# Patient Record
Sex: FEMALE | Race: WHITE | URBAN - METROPOLITAN AREA
[De-identification: names, ages, dates, MRNs, and addresses within clinical notes are randomized per-mention and may not be internally consistent; named-entity substitution may affect disease eponyms.]

---

## 2018-12-06 ENCOUNTER — OFFICE VISIT - HEALTHEAST (OUTPATIENT)
Dept: GERIATRICS | Facility: CLINIC | Age: 58
End: 2018-12-06

## 2018-12-06 ENCOUNTER — COMMUNICATION - HEALTHEAST (OUTPATIENT)
Dept: GERIATRICS | Facility: CLINIC | Age: 58
End: 2018-12-06

## 2018-12-06 ENCOUNTER — AMBULATORY - HEALTHEAST (OUTPATIENT)
Dept: ADMINISTRATIVE | Facility: CLINIC | Age: 58
End: 2018-12-06

## 2018-12-06 DIAGNOSIS — M62.830 BACK MUSCLE SPASM: ICD-10-CM

## 2018-12-06 DIAGNOSIS — M54.89 OTHER CHRONIC BACK PAIN: ICD-10-CM

## 2018-12-06 DIAGNOSIS — Z74.09 MOBILITY IMPAIRED: ICD-10-CM

## 2018-12-06 DIAGNOSIS — F11.24 OPIOID DEPENDENCE WITH OPIOID-INDUCED MOOD DISORDER (H): ICD-10-CM

## 2018-12-06 DIAGNOSIS — R52 PAIN: ICD-10-CM

## 2018-12-06 DIAGNOSIS — G89.29 OTHER CHRONIC BACK PAIN: ICD-10-CM

## 2018-12-07 ENCOUNTER — OFFICE VISIT - HEALTHEAST (OUTPATIENT)
Dept: GERIATRICS | Facility: CLINIC | Age: 58
End: 2018-12-07

## 2018-12-07 DIAGNOSIS — Z74.09 MOBILITY IMPAIRED: ICD-10-CM

## 2018-12-07 DIAGNOSIS — M54.89 OTHER CHRONIC BACK PAIN: ICD-10-CM

## 2018-12-07 DIAGNOSIS — G89.29 OTHER CHRONIC BACK PAIN: ICD-10-CM

## 2018-12-07 DIAGNOSIS — M62.830 BACK MUSCLE SPASM: ICD-10-CM

## 2018-12-10 ENCOUNTER — RECORDS - HEALTHEAST (OUTPATIENT)
Dept: LAB | Facility: CLINIC | Age: 58
End: 2018-12-10

## 2018-12-10 ENCOUNTER — OFFICE VISIT - HEALTHEAST (OUTPATIENT)
Dept: GERIATRICS | Facility: CLINIC | Age: 58
End: 2018-12-10

## 2018-12-10 DIAGNOSIS — G89.29 OTHER CHRONIC BACK PAIN: ICD-10-CM

## 2018-12-10 DIAGNOSIS — J06.9 UPPER RESPIRATORY INFECTION WITH COUGH AND CONGESTION: ICD-10-CM

## 2018-12-10 DIAGNOSIS — Z74.09 MOBILITY IMPAIRED: ICD-10-CM

## 2018-12-10 DIAGNOSIS — M54.89 OTHER CHRONIC BACK PAIN: ICD-10-CM

## 2018-12-10 LAB
FLUAV AG SPEC QL IA: ABNORMAL
FLUBV AG SPEC QL IA: ABNORMAL

## 2018-12-12 ENCOUNTER — OFFICE VISIT - HEALTHEAST (OUTPATIENT)
Dept: GERIATRICS | Facility: CLINIC | Age: 58
End: 2018-12-12

## 2018-12-12 DIAGNOSIS — J11.1 INFLUENZA: ICD-10-CM

## 2018-12-12 DIAGNOSIS — M54.89 OTHER CHRONIC BACK PAIN: ICD-10-CM

## 2018-12-12 DIAGNOSIS — G89.29 OTHER CHRONIC BACK PAIN: ICD-10-CM

## 2018-12-14 ENCOUNTER — AMBULATORY - HEALTHEAST (OUTPATIENT)
Dept: GERIATRICS | Facility: CLINIC | Age: 58
End: 2018-12-14

## 2018-12-15 ENCOUNTER — COMMUNICATION - HEALTHEAST (OUTPATIENT)
Dept: SCHEDULING | Facility: CLINIC | Age: 58
End: 2018-12-15

## 2018-12-16 ENCOUNTER — ANCILLARY PROCEDURE (OUTPATIENT)
Dept: GENERAL RADIOLOGY | Facility: CLINIC | Age: 58
End: 2018-12-16
Attending: FAMILY MEDICINE
Payer: COMMERCIAL

## 2018-12-16 ENCOUNTER — OFFICE VISIT (OUTPATIENT)
Dept: URGENT CARE | Facility: URGENT CARE | Age: 58
End: 2018-12-16
Payer: COMMERCIAL

## 2018-12-16 VITALS
RESPIRATION RATE: 18 BRPM | HEART RATE: 80 BPM | WEIGHT: 115 LBS | SYSTOLIC BLOOD PRESSURE: 106 MMHG | TEMPERATURE: 99.1 F | OXYGEN SATURATION: 98 % | DIASTOLIC BLOOD PRESSURE: 58 MMHG

## 2018-12-16 DIAGNOSIS — R06.02 SOB (SHORTNESS OF BREATH): ICD-10-CM

## 2018-12-16 DIAGNOSIS — J22 LOWER RESPIRATORY TRACT INFECTION: Primary | ICD-10-CM

## 2018-12-16 DIAGNOSIS — J98.01 BRONCHOSPASM: ICD-10-CM

## 2018-12-16 PROCEDURE — 94640 AIRWAY INHALATION TREATMENT: CPT | Mod: 76 | Performed by: FAMILY MEDICINE

## 2018-12-16 PROCEDURE — 71046 X-RAY EXAM CHEST 2 VIEWS: CPT

## 2018-12-16 PROCEDURE — 99205 OFFICE O/P NEW HI 60 MIN: CPT | Mod: 25 | Performed by: FAMILY MEDICINE

## 2018-12-16 RX ORDER — ACETAMINOPHEN 325 MG/1
325-650 TABLET ORAL EVERY 6 HOURS PRN
COMMUNITY

## 2018-12-16 RX ORDER — ALBUTEROL SULFATE 90 UG/1
2 AEROSOL, METERED RESPIRATORY (INHALATION) EVERY 6 HOURS PRN
Qty: 1 INHALER | Refills: 0 | Status: SHIPPED | OUTPATIENT
Start: 2018-12-16 | End: 2019-01-01

## 2018-12-16 RX ORDER — PROPRANOLOL HCL 60 MG
CAPSULE, EXTENDED RELEASE 24HR ORAL DAILY
COMMUNITY

## 2018-12-16 RX ORDER — IPRATROPIUM BROMIDE AND ALBUTEROL SULFATE 2.5; .5 MG/3ML; MG/3ML
3 SOLUTION RESPIRATORY (INHALATION) ONCE
Status: COMPLETED | OUTPATIENT
Start: 2018-12-16 | End: 2018-12-17

## 2018-12-16 RX ORDER — AZITHROMYCIN 250 MG/1
TABLET, FILM COATED ORAL
Qty: 6 TABLET | Refills: 0 | Status: SHIPPED | OUTPATIENT
Start: 2018-12-16

## 2018-12-16 RX ORDER — PREDNISONE 20 MG/1
40 TABLET ORAL DAILY
Qty: 10 TABLET | Refills: 0 | Status: SHIPPED | OUTPATIENT
Start: 2018-12-16 | End: 2018-12-21

## 2018-12-16 RX ORDER — METHOCARBAMOL 500 MG/1
500 TABLET, FILM COATED ORAL 4 TIMES DAILY PRN
COMMUNITY

## 2018-12-16 RX ORDER — PREGABALIN 75 MG/1
75 CAPSULE ORAL 3 TIMES DAILY
COMMUNITY

## 2018-12-16 RX ORDER — DIAZEPAM 5 MG
5 TABLET ORAL EVERY 6 HOURS PRN
COMMUNITY

## 2018-12-17 RX ADMIN — IPRATROPIUM BROMIDE AND ALBUTEROL SULFATE 3 ML: 2.5; .5 SOLUTION RESPIRATORY (INHALATION) at 13:47

## 2018-12-17 RX ADMIN — IPRATROPIUM BROMIDE AND ALBUTEROL SULFATE 3 ML: 2.5; .5 SOLUTION RESPIRATORY (INHALATION) at 13:49

## 2018-12-17 NOTE — PROGRESS NOTES
SUBJECTIVE:   New patient to [place]    Laura Ponce is a 58 year old female presenting with a chief complaint of cough and wheezing.    Patient was hospitalized at [facility] for spine surgery, discharged home but did not do well, ended up going back to hospital and was hospitalized for 1 week and afterwards sent to [facility]CU in [place].    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  Patient contracted influenza when in TCU but was too late for treatment by the time this was diagnosed.  Patient thought that was feeling a little better about 5 days out but 2 days ago, developed worsening symptoms.  Endorsed SOB, wheezing.  Patient denies any fever but has been taking medications.  Patient was prior TOB user.  Patient also complained of ?drainage from back surgery wound but was told that okay.  Patient has been discharged from TCU about 2 days ago.      Follow up with surgeon 12/28    Family history - negative for asthma or arthritis, DJD    No PCP currently - moved from [place]    History reviewed. No pertinent past medical history.  Current Outpatient Medications   Medication Sig Dispense Refill     acetaminophen (TYLENOL) 325 MG tablet Take 325-650 mg by mouth every 6 hours as needed for mild pain       diazepam (VALIUM) 5 MG tablet Take 5 mg by mouth every 6 hours as needed for anxiety       methocarbamol (ROBAXIN) 500 MG tablet Take 500 mg by mouth 4 times daily as needed for muscle spasms       oxyCODONE HCl (OXAYDO) 5 MG TABA Take 5 mg by mouth       pregabalin (LYRICA) 75 MG capsule Take 75 mg by mouth 3 times daily       propranolol ER (INDERAL LA) 60 MG 24 hr capsule Take by mouth daily       Social History     Tobacco Use     Smoking status: Never Smoker     Smokeless tobacco: Never Used   Substance Use Topics     Alcohol use: Not on file       ROS:  Review of systems otherwise  negative except as stated above.    OBJECTIVE:  /58   Pulse 80   Temp 99.1  F (37.3  C) (Oral)   Resp 18   Wt 52.2 kg (115 lb)   SpO2 92%   GENERAL APPEARANCE: healthy, alert and mild distress  EYES: EOMI,  PERRL, conjunctiva clear  HENT: ear canals and TM's normal.  Nose and mouth without ulcers, erythema or lesions  NECK: supple, nontender, no lymphadenopathy  RESP: lungs coarse rhonchi diffusely, scattered wheezes, hard back brace   CV: regular rates and rhythm, normal S1 S2, no murmur noted  ABDOMEN:  soft, nontender, no HSM or masses and bowel sounds normal  Extremities: no peripheral edema or tenderness, peripheral pulses normal  SKIN: no suspicious lesions or rashes, healing wound with mild surrounding erythema, fibrous yellowed central patches  PSYCH: mentation appears normal, affect flat and fatigued    CXR - thoracic-lumbar hardware, mild perihilar congestion, no acute infiltrate, no pleural effusion    ASSESSMENT/PLAN:  (J22) Lower respiratory tract infection  (primary encounter diagnosis)  Plan: azithromycin (ZITHROMAX) 250 MG tablet            (J98.01) Bronchospasm  Plan: XR Chest 2 Views, ipratropium - albuterol 0.5         mg/2.5 mg/3 mL (DUONEB) neb solution 3 mL,         INHALATION/NEBULIZER TREATMENT, INITIAL,         ipratropium - albuterol 0.5 mg/2.5 mg/3 mL         (DUONEB) neb solution 3 mL, HC         INHALATION/NEBULIZER TREATMENT, ADDITIONAL,         albuterol (PROAIR HFA/PROVENTIL HFA/VENTOLIN         HFA) 108 (90 Base) MCG/ACT inhaler, predniSONE         (DELTASONE) 20 MG tablet            (R06.02) SOB (shortness of breath)  Comment: bronchospasm  Plan: XR Chest 2 Views, ipratropium - albuterol 0.5         mg/2.5 mg/3 mL (DUONEB) neb solution 3 mL,         INHALATION/NEBULIZER TREATMENT, INITIAL,         ipratropium - albuterol 0.5 mg/2.5 mg/3 mL         (DUONEB) neb solution 3 mL, HC         INHALATION/NEBULIZER TREATMENT, ADDITIONAL,         albuterol (PROAIR HFA/PROVENTIL  HFA/VENTOLIN         HFA) 108 (90 Base) MCG/ACT inhaler            Duoneb given with mild improvement of symptoms with no changes in oxygen, still at 92% with wheezes.  Duoneb #2 given with improvement of symptoms, still with wheezes but improvement of O2 to 98%.  RX Azithromycin given for lower respiratory tract infection, RX prednisone burst given for bronchospasm symptoms.  RX albuterol inhaler given for cough and wheezing.  Encourage to continue with incentive spirometry especially in setting of recent back surgery.  To ER if any acute worsening of symptoms.    Follow up with primary provider within 1 week.    Chandana Zuniga MD

## 2018-12-17 NOTE — PATIENT INSTRUCTIONS
Take full course of antibiotic for lower respiratory tract infection.  Take full course of prednisone burst.  Use albuterol inhaler to help with wheezing, cough, and shortness of breath.    Continue with current medications from Surgeon.      Patient Education     Bronchospasm (Adult)    Bronchospasm occurs when the airways (bronchial tubes) go into spasm and contract. This makes it hard to breathe and causes wheezing (a high-pitched whistling sound). Bronchospasm can also cause frequent coughing without wheezing.  Bronchospasm is due to irritation, inflammation, or allergic reaction of the airways. People with asthma get bronchospasm. However, not everyone with bronchospasm has asthma.  Being exposed to harmful fumes, a recent case of bronchitis, exercise, or a flare-up of chronic obstructive pulmonary disease (COPD) may cause the airways to spasm. An episode of bronchospasm may last 7 to 14 days. Medicine may be prescribed to relax the airways and prevent wheezing. Antibiotics will be prescribed only if your healthcare provider thinks there is a bacterial infection. Antibiotics do not help a viral infection.  Home care    Drink lots of water or other fluids (at least 10 glasses a day) during an attack. This will loosen lung secretions and make it easier to breathe. If you have heart or kidney disease, check with your doctor before you drink extra fluids.    Take prescribed medicine exactly at the times advised. If you take an inhaled medicine to help with breathing, don't use it more than once every 4 hours, unless told to do so. If prescribed an antibiotic or prednisone, take all of the medicine, even if you are feeling better after a few days.    Don't smoke. Also avoid being exposed to secondhand smoke.    If you were given an inhaler, use it exactly as directed. If you need to use it more often than prescribed, your condition may be getting worse. Contact your healthcare provider.  Follow-up care  Follow up  with your healthcare provider, or as advised.  If you are age 65 or older, have a chronic lung disease or condition that affects your immune system, or you smoke, ask your healthcare provider about getting a pneumococcal vaccine, as well as a yearly flu shot (influenza vaccine).  When to seek medical advice  Call your healthcare provider right away if any of these occur:    You need to use your inhalers more often than usual    Fever of 100.4 F (38 C) or higher, or as directed by your healthcare provider    Cough that brings up lots of dark-colored sputum (mucus)    You don't get better within 24 hours  Call 911  Call 911 if any of these occur:    Coughing up bloody sputum (mucus)    Chest pain with each breath    Increased wheezing or shortness of breath   Date Last Reviewed: 6/1/2018 2000-2018 [employer]. [address] [place] [address]. All rights reserved. This information is not intended as a substitute for professional medical care. Always follow your healthcare professional's instructions.           Patient Education     Bronchitis, Antibiotic Treatment (Adult)    Bronchitis is an infection of the air passages (bronchial tubes) in your lungs. It often occurs when you have a cold. This illness is contagious during the first few days and is spread through the air by coughing and sneezing, or by direct contact (touching the sick person and then touching your own eyes, nose, or mouth).  Symptoms of bronchitis include cough with mucus (phlegm) and low-grade fever. Bronchitis usually lasts 7 to 14 days. Mild cases can be treated with simple home remedies. More severe infection is treated with an antibiotic.  Home care  Follow these guidelines when caring for yourself at home:    If your symptoms are severe, rest at home for the first 2 to 3 days. When you go back to your usual activities, don't let yourself get too tired.    Don't smoke. Also stay away from secondhand smoke.    You may  use over-the-counter medicines to control fever or pain, unless another medicine was prescribed. If you have chronic liver or kidney disease or have ever had a stomach ulcer or gastrointestinal bleeding, talk with your healthcare provider before using these medicines. Also talk to your provider if you are taking medicine to prevent blood clots. Aspirin should never be given to anyone younger than 18 who is ill with a viral infection or fever. It may cause severe liver or brain damage.    Your appetite may be low, so a light diet is fine. Stay well hydrated by drinking 6 to 8 glasses of fluids per day. This includes water, soft drinks, sports drinks, juices, tea, or soup. Extra fluids will help loosen mucus in your nose and lungs.    Over-the-counter cough, cold, and sore-throat medicines will not shorten the length of the illness, but they may be helpful to reduce your symptoms. Don't use decongestants if you have high blood pressure.    Finish all antibiotic medicine. Do this even if you are feeling better after only a few days.  Follow-up care  Follow up with your healthcare provider, or as advised. If you had an X-ray or ECG (electrocardiogram), a specialist will review it. You will be told of any new test results that may affect your care.  If you are age 65 or older, if you smoke, or if you have a chronic lung disease or condition that affects your immune system, ask your healthcare provider about getting a pneumococcal vaccine and a yearly flu shot (influenza vaccine).  When to seek medical advice  Call your healthcare provider right away if any of these occur:    Fever of 100.4 F (38 C) or higher, or as directed by your healthcare provider    Coughing up more sputum    Weakness, drowsiness, headache, facial pain, ear pain, or a stiff neck     Call 911  Call 911 if any of these occur.    Coughing up blood    Weakness, drowsiness, headache, or stiff neck that get worse    Trouble breathing, wheezing, or pain  with breathing   Date Last Reviewed: 6/1/2018 2000-2018 [employer]. [address] [facility], [place] [address]. All rights reserved. This information is not intended as a substitute for professional medical care. Always follow your healthcare professional's instructions.           Patient Education     What is COPD?  COPD stands for chronic obstructive pulmonary disease. It means the airways in your lungs are blocked (obstructed). Because of this, it is hard to breathe. You may have trouble with daily activities because of shortness of breath. Over time the shortness of breath usually worsens making it more and more difficult to take care of yourself and take part in activities. Chronic bronchitis and emphysema are two common types of COPD.  What happens in chronic bronchitis?    The cells in the airways make more mucus than normal. The mucus builds up, narrowing the airways. This means less air travels into and out of the lungs. The lining of the airways may also become inflamed (swollen) and causes the airways to narrow even more.        What happens in emphysema?    The small airways are damaged and lose their stretchiness. The airways collapse when you exhale, causing air to get trapped in the air sacs. This means that less oxygen enters the blood vessels and less oxygen is delivered to all of the cells of your body. This makes it hard to breathe.     Damage to cilia    Cilia are small hairs that line and protect the airways. Smoking damages the cilia. Damaged cilia can t sweep mucus and particles away. Some of the cilia are destroyed. This damage worsens COPD.  How did I get COPD?  Most people get COPD from smoking. Cigarette smoke damages lungs, which can develop into COPD over many years.  How COPD affects you  COPD makes you work harder to breathe. Air may get trapped in the lungs, which prevents your lungs from filling completely with fresh oxygen-filled air when you inhale (breathe in).  It's harder to take deep breaths especially when you are active and start breathing faster. Over time, your lungs may become enlarged filling the lung with air that does not transfer oxygen into the blood. These problems cause you to have shortness of breath (also called dyspnea). Wheezing (hoarse, whistling breathing), chronic cough, and fatigue (feeling tired and worn out) are also common.   Date Last Reviewed: 5/1/2016 2000-2018 [employer]. [address] [place] [id number]. All rights reserved. This information is not intended as a substitute for professional medical care. Always follow your healthcare professional's instructions.

## 2021-06-02 VITALS — WEIGHT: 126.6 LBS

## 2021-06-02 VITALS — WEIGHT: 126.1 LBS

## 2021-06-16 PROBLEM — J11.1 INFLUENZA: Status: ACTIVE | Noted: 2018-12-12

## 2021-06-16 PROBLEM — J06.9 UPPER RESPIRATORY INFECTION WITH COUGH AND CONGESTION: Status: ACTIVE | Noted: 2018-12-10

## 2021-06-16 PROBLEM — G89.29 CHRONIC BACK PAIN: Status: ACTIVE | Noted: 2018-12-07

## 2021-06-16 PROBLEM — Z74.09 MOBILITY IMPAIRED: Status: ACTIVE | Noted: 2018-12-07

## 2021-06-16 PROBLEM — M62.830 BACK MUSCLE SPASM: Status: ACTIVE | Noted: 2018-12-07

## 2021-06-16 PROBLEM — M54.9 CHRONIC BACK PAIN: Status: ACTIVE | Noted: 2018-12-07

## 2021-06-22 NOTE — PROGRESS NOTES
[facility]      NAME:  Laura Harvey             :  1960    MRN: [id number]    CODE STATUS:  FULL CODE    FACILITY: [facility] [[id number]]         CHIEF COMPLAIN/REASON FOR VISIT:  Chief Complaint   Patient presents with     Review Of Multiple Medical Conditions       HISTORY OF PRESENT ILLNESS: Laura Harvey is a 58 y.o. female being seen at the request of the nurses as pt is running a low grade temp, has increase congestion and decreased 02 sat 88%.  She was in the hospital for a lumbar spinal fusion and extensive back surgery. Then,  postoperatively she did well was discharged home but subsequently presented back to the hospital with acute postoperative pain with history of fall and dysuria she was diagnosed with a UTI and was readmitted  Her history reveal she is dependant on high doses of narcotics. She is on Lyrica, oxycodone and valium, see med list.   She is frail in appearence., pale and reports increase shortness of breath.    Allergies   Allergen Reactions     Adhesive Hives     Sulfa (Sulfonamide Antibiotics) Hives   :     Current Outpatient Medications   Medication Sig     acetaminophen (TYLENOL) 500 MG tablet Take 1,000 mg by mouth 3 (three) times a day For 1 week then 1000mg q8h prn .     calcium carbonate-mag hydroxid (ROLAIDS) 550-110 mg Chew Chew 2 tablets as needed.     calcium-vitamin D 500 mg(1,250mg) -200 unit per tablet Take 2 tablets by mouth 2 (two) times a day with meals.     celecoxib (CELEBREX) 100 MG capsule Take 100 mg by mouth 2 (two) times a day.     diazePAM (VALIUM) 5 MG tablet Take 5 mg by mouth every 8 (eight) hours as needed for anxiety.     methocarbamol (ROBAXIN) 500 MG tablet Take 500 mg by mouth every 6 (six) hours as needed.     multivitamin with minerals tablet Take 1 tablet by mouth daily.     oxyCODONE (OXYCONTIN) 20 mg 12 hr tablet Take 20 mg by mouth every 8 (eight) hours.     oxyCODONE (ROXICODONE) 5 MG immediate  release tablet Take 2-3 tablets (10-15 mg total) by mouth every 4 (four) hours as needed for pain (2tabs pain 3-6, 3tabs pain 7-10).     oxymetazoline (AFRIN) 0.05 % nasal spray Apply 2 sprays into each nostril daily as needed for congestion.     polyethylene glycol (MIRALAX) 17 gram packet Take 17 g by mouth daily as needed.     pregabalin (LYRICA) 75 MG capsule Take 75 mg by mouth 3 (three) times a day.     propranolol (INDERAL LA) 60 mg 24 hr capsule Take 60 mg by mouth 2 (two) times a day.     senna-docusate (SENNOSIDES-DOCUSATE SODIUM) 8.6-50 mg tablet Take 1 tablet by mouth at bedtime.         REVIEW OF SYSTEMS:    Currently, LOW GRADE fever, no chills, or rigors. Does not have any visual or hearing problems. Denies any chest pain, headaches, palpitations, lightheadedness, dizziness, shortness of breath, non productive cough. Appetite is good. Denies any GERD symptoms. Denies any difficulty with swallowing, nausea, or vomiting.  Denies any abdominal pain, diarrhea or constipation. Denies any urinary symptoms. No insomnia. No active bleeding. No rash. Reports back pain and stiffness.      PHYSICAL EXAMINATION:  Vitals:    12/10/18 1542   BP: 107/63   Pulse: 69   Temp: 99.3  F (37.4  C)   Weight: 126 lb 9.6 oz (57.4 kg)         GENERAL: Thin middle aged female,  Awake, Alert, oriented x3, not in any form of acute distress, answers questions appropriately, follows simple commands, conversant  HEENT: Head is normocephalic with normal hair distribution. No evidence of trauma. Ears: No acute purulent discharge. Eyes: Conjunctivae pink with no scleral jaundice. Nose: Normal mucosa and septum.   CHEST: No tenderness or deformity, no crepitus  LUNG:Bilateral wheezing, DM BS.  BACK: No kyphosis of the thoracic spine. Symmetric, no curvature, she is sitting erect in chair, fully dressed.  CVS: There is good S1  S2, rhythm is regular.  ABDOMEN: Globular and soft, nontender to palpation, non distended, no masses, no  organomegaly, good bowel sounds, no rebound or guarding, no peritoneal signs.   EXTREMITIES: Atraumatic. Full range of motion on both upper and lower extremities, there is no tenderness to palpation, no pedal edema, no cyanosis or clubbing, no calf tenderness, normal cap refill, no joint swelling.  SKIN: Warm and dry, no erythema noted, no rashes or lesions.  NEUROLOGICAL: The patient is oriented to person, place and time. Strength and sensation are grossly intact. Face is symmetric.                    LABS:    No results found for: WBC, HGB, HCT, MCV, PLT    No results found for this or any previous visit.        No results found for: HGBA1C  No results found for: [id number]  No results found for: [id number]    ASSESSMENT/PLAN:  1. Other chronic back pain    2. Mobility impaired    3. Upper respiratory infection with cough and congestion      1/2. Chronic back pain: On valium, lyrica and oxycodone.on celbrex,started last week, but per nursing continues with prn oxycodone as well.  Up with walker when ambulating.. Will continue with therapy for strengthening and rehab services.    3. New onset respiratory congestion: 2 view CXR, duo nebs three times a day , Claratin 10 mg po and robitussin 5 cc po prn. We will also order a nasal swab, for influenza, as tis the season.           Electronically signed by:  Юлия Villatoro CNP  This progress note was completed using Dragon software and there may be grammatical errors.      38 minutes spent of which greater than 75% was face to face communication with the patient about above plan of care

## 2021-06-22 NOTE — PROGRESS NOTES
[facility]      Code Status:  FULL CODE  Visit Type: H & P     Facility:  [facility] [[id number]]           History of Present Illness: Laura Harvey is a 58 y.o. female who was admitted to the TCU.  She was recently in the hospital for lumbar spinal fusion and extensive back surgery postoperatively she did well was discharged home but subsequently presented back to the hospital with acute postoperative pain with history of fall and dysuria she was diagnosed with a UTI and admitted back  This is a patient who was dependent on high doses of narcotics.  She continued to voice bilateral lower extremity weakness right greater than left with bilateral upper extremity weakness also.  She uses a walker for ambulation but was having difficulty with that.  She reported some dysuria with elevated temperatures  Spine MRI was ordered for her but unfortunately she could not tolerate a spine MRI due to neck discomfort  She was treated for UTI.  She was discharged on very high doses of narcotics including OxyContin scheduled along with oxycodone and high doses every 4 hours as needed.  She remains on Robaxin and diazepam for muscle pain.  Discharge to the TCU.  She is very angry and agitated during my exam today.  She told me that she is here only for sleeping and resting and not for therapy.  She is requesting staff to let her sleep and not disturb her except to give her pain pills around the clock every 4 hours as she does not want to miss a single dose.  She does not want to have any other kind of evaluations done.  She is quite adamant that her surgeon would not want her to do anything other than just rest in the TCU and that stable purpose of coming here she understands she is on high doses of narcotics but this is her routine.  Past Medical History:   Diagnosis Date     Acute postoperative pain      Asthma      Back muscle spasm      Cardiac arrhythmia      Cervical myelopathy (H)       Chronic back pain      Chronic neck pain      Depression      Dysuria      Mobility impaired      Schamberg's disease      Thrombocytopenia (H)      Past Surgical History:   Procedure Laterality Date     ANTERIOR FUSION CERVICAL SPINE  2015     AUGMENTATION MAMMAPLASTY       CERVICAL FUSION       LUMBAR FUSION       LUMBAR LAMINECTOMY       POSTERIOR FUSION THORACIC SPINE  2018    T2-L2     Family History   Problem Relation Age of Onset     Hypertension Mother      Heart disease Father      Social History     Socioeconomic History     Marital status: Not on file     Spouse name: Not on file     Number of children: Not on file     Years of education: Not on file     Highest education level: Not on file   Social Needs     Financial resource strain: Not on file     Food insecurity - worry: Not on file     Food insecurity - inability: Not on file     Transportation needs - medical: Not on file     Transportation needs - non-medical: Not on file   Occupational History     Not on file   Tobacco Use     Smoking status: Former Smoker     Packs/day: 0.50     Years: 25.00     Pack years: 12.50     Types: Cigarettes     Last attempt to quit: 2018     Years since quittin.2     Smokeless tobacco: Current User   Substance and Sexual Activity     Alcohol use: No     Drug use: No     Sexual activity: Not on file   Other Topics Concern     Not on file   Social History Narrative     Not on file     Current Outpatient Medications   Medication Sig Dispense Refill     acetaminophen (TYLENOL) 500 MG tablet Take 1,000 mg by mouth 3 (three) times a day For 1 week then 1000mg q8h prn .       calcium carbonate-mag hydroxid (ROLAIDS) 550-110 mg Chew Chew 2 tablets as needed.       calcium-vitamin D 500 mg(1,250mg) -200 unit per tablet Take 2 tablets by mouth 2 (two) times a day with meals.       diazePAM (VALIUM) 5 MG tablet Take 5 mg by mouth every 8 (eight) hours as needed for anxiety.       methocarbamol  (ROBAXIN) 500 MG tablet Take 500 mg by mouth every 6 (six) hours as needed.       multivitamin with minerals tablet Take 1 tablet by mouth daily.       oxyCODONE (ROXICODONE) 10 mg immediate release tablet Take 20 mg by mouth 3 (three) times a day And 5-15mg q4h prn .       oxymetazoline (AFRIN) 0.05 % nasal spray Apply 2 sprays into each nostril daily as needed for congestion.       polyethylene glycol (MIRALAX) 17 gram packet Take 17 g by mouth daily as needed.       pregabalin (LYRICA) 75 MG capsule Take 75 mg by mouth 3 (three) times a day.       propranolol (INDERAL LA) 60 mg 24 hr capsule Take 60 mg by mouth 2 (two) times a day.       senna-docusate (SENNOSIDES-DOCUSATE SODIUM) 8.6-50 mg tablet Take 1 tablet by mouth at bedtime.       No current facility-administered medications for this visit.      Allergies   Allergen Reactions     Adhesive Hives     Sulfa (Sulfonamide Antibiotics) Hives         Review of Systems:    Constitutional: Negative.  Negative for fever, chills, has activity change, appetite change and fatigue.  Not eatinf much   HENT: Negative for congestion and facial swelling.    Eyes: Negative for photophobia, redness and visual disturbance.   Respiratory: Negative for cough and chest tightness.    Cardiovascular: Negative for chest pain, palpitations and leg swelling.   Gastrointestinal: Negative for nausea, diarrhea, constipation, blood in stool and abdominal distention.   Genitourinary: Negative.    Musculoskeletal: Negative. Having severe back pain   Skin: Negative.    Neurological: Negative for dizziness, tremors, syncope, weakness, light-headedness and headaches.   Hematological: Does not bruise/bleed easily.   Psychiatric/Behavioral: Negative.  sleeps in her room;does not want to be disturbed except for pain pill  bp 123/74 t98 p97    Physical Exam:    GENERAL: no acute distress. Cooperative in conversation.   HEENT: pupils are equal, round and reactive. Oral mucosa is moist and  intact.  RESP:Chest symmetric. Regular respiratory rate. No stridor.  CVS: S1S2  ABD: Nondistended, soft.midline back incision is healed  EXTREMITIES: No lower extremity edema.   NEURO: non focal. Alert and oriented x3.   PSYCH: within normal limits. No depression or anxiety.voicing anger at caregivers  SKIN: warm dry intact     Labs:    Recent Labs   12/01/18  0714 11/30/18  2255   WBC 4.8 7.1   RBC 3.26 L 3.42 L   HGB 9.8 L 10.4 L   HCT 30.9 L 32.4 L   MCV 95 95   MCH 30.1 30.4   MCHC 31.7 L 32.1    327   MPV 9.0 9.2     Recent Labs   12/01/18  0714 11/30/18  2255 11/24/18  2356   SODIUM 140 140 145   POTASSIUM 4.0 4.1 3.5   CHLORIDE 101 101 107   UU4AGAZE 35 H 30 28   BUN 4 L 4 L 4 L   CREATININE 0.53 L 0.52 L 0.54 L   CALCIUM 9.5 9.5 9.9   GLUCOSE 98 94 87     Recent Labs   12/01/18  1720 11/24/18  2350   COLOR Yellow Yellow   CLARITY Clear Clear   SPECGRAV <=1.005 A <=1.005 A   PHURINE 7.0 7.0   UROBILINOGEN Normal Normal   PROTEINUA Negative Negative   GLUCOSEUA Negative Negative   KETONESUA Negative Negative   BLOODUA Negative Negative   NITRITE Negative Negative   LEUKOCYTE Negative Negative     Recent Labs   12/01/18 0714   CREACTPROT 2.90 H     Assessment/Plan:    Acute on chronic pain in a patient with high degree of narcotic dependence.  Status post lumbar spinal fusion.  History of chronic opiate use.  History of bilateral lower extremity weakness right greater than left.  UTI currently resolved.  Debilitation currently ambulates using a walker.  Patient has been admitted to the TCU.  She has been very angry since admission and has been very resistive to cares.  She feels her needs are not being met in the TCU setting she is primarily here to sleep to get stronger and to go home as per her.  The reason she did not go home is because her significant other works and cannot be around her.  We did talk about goals of care including trying some therapy to get stronger.  She told me she only ambulates  using a walker and she does not plan to do anything more than that.  She is quite adamant that she has been told by her surgeon that other than walking she should not let anybody touch her back.  She is also been told that nothing else needs to be tried.  We did talk about medication management and she does not want any assessment she does want around-the-clock every 4 hours pain medications to be delivered to her as she does not want to fall behind on any of them but other than that she does not want any nursing staff to enter her room as per her.  She told me she was extremely angry and upset and wanted me to address her issues with the nursing manager  Care plan also reviewed with nursing as well as therapy and she has been refusing all kind of cares from them.  She gets extremely angry when they try to intervene and tell him to leave her room immediately.  Nursing has been told by her that she does not want a assessment done prior to administration of her pain pills and she wants a full dose administered to her  Care plan was reviewed with pt;SW and Nursing and PT- staff frustrated at pts non participation and desire to be left alone   She also has her own narcotics at bedside and has been self administering in addition to Rx meds  Advised to send them home  urgent care conference recommended to discuss goals of care with pt and TCU stay.her surgeon to be contacted and updated      Electronically signed by: ROB Burk  This progress note was completed using Dragon software and there may be grammatical errors.

## 2021-06-22 NOTE — PROGRESS NOTES
[facility]      NAME:  Laura Harvey             :  1960    MRN: [id number]    CODE STATUS:  FULL CODE    FACILITY: [facility] [[id number]]         CHIEF COMPLAIN/REASON FOR VISIT:  Chief Complaint   Patient presents with     Review Of Multiple Medical Conditions       HISTORY OF PRESENT ILLNESS: Laura Harvey is a 58 y.o. female being seen at the request of the nurses as pt is requesting med review. She was in the hospital for a lumbar spinal fusion and extensive back surgery. Then,  postoperatively she did well was discharged home but subsequently presented back to the hospital with acute postoperative pain with history of fall and dysuria she was diagnosed with a UTI and was readmitted  Her history reveal she is dependant on high doses of narcotics. She is on Lyrica, oxycodone and valium, see med list.  She continued to voice bilateral lower extremity weakness right greater than left with bilateral upper extremity weakness also. Seen with her SO in room, then later up walking with a walker and therapy. She is frail in appearence.    Allergies   Allergen Reactions     Adhesive Hives     Sulfa (Sulfonamide Antibiotics) Hives   :     Current Outpatient Medications   Medication Sig     acetaminophen (TYLENOL) 500 MG tablet Take 1,000 mg by mouth 3 (three) times a day For 1 week then 1000mg q8h prn .     calcium carbonate-mag hydroxid (ROLAIDS) 550-110 mg Chew Chew 2 tablets as needed.     calcium-vitamin D 500 mg(1,250mg) -200 unit per tablet Take 2 tablets by mouth 2 (two) times a day with meals.     diazePAM (VALIUM) 5 MG tablet Take 5 mg by mouth every 8 (eight) hours as needed for anxiety.     methocarbamol (ROBAXIN) 500 MG tablet Take 500 mg by mouth every 6 (six) hours as needed.     multivitamin with minerals tablet Take 1 tablet by mouth daily.     oxyCODONE (OXYCONTIN) 20 mg 12 hr tablet Take 20 mg by mouth every 8 (eight) hours.     oxyCODONE (ROXICODONE) 5  MG immediate release tablet Take 2-3 tablets (10-15 mg total) by mouth every 4 (four) hours as needed for pain (2tabs pain 3-6, 3tabs pain 7-10).     oxymetazoline (AFRIN) 0.05 % nasal spray Apply 2 sprays into each nostril daily as needed for congestion.     polyethylene glycol (MIRALAX) 17 gram packet Take 17 g by mouth daily as needed.     pregabalin (LYRICA) 75 MG capsule Take 75 mg by mouth 3 (three) times a day.     propranolol (INDERAL LA) 60 mg 24 hr capsule Take 60 mg by mouth 2 (two) times a day.     senna-docusate (SENNOSIDES-DOCUSATE SODIUM) 8.6-50 mg tablet Take 1 tablet by mouth at bedtime.         REVIEW OF SYSTEMS:    Currently, no fever, chills, or rigors. Does not have any visual or hearing problems. Denies any chest pain, headaches, palpitations, lightheadedness, dizziness, shortness of breath, or cough. Appetite is good. Denies any GERD symptoms. Denies any difficulty with swallowing, nausea, or vomiting.  Denies any abdominal pain, diarrhea or constipation. Denies any urinary symptoms. No insomnia. No active bleeding. No rash. Reports back pain and stiffness.      PHYSICAL EXAMINATION:  Vitals:    12/07/18 1937   BP: 114/62   Pulse: (!) 56   Temp: 97.1  F (36.2  C)   Weight: 126 lb 9.6 oz (57.4 kg)         GENERAL: Thin middle aged female,  Awake, Alert, oriented x3, not in any form of acute distress, answers questions appropriately, follows simple commands, conversant  HEENT: Head is normocephalic with normal hair distribution. No evidence of trauma. Ears: No acute purulent discharge. Eyes: Conjunctivae pink with no scleral jaundice. Nose: Normal mucosa and septum.   CHEST: No tenderness or deformity, no crepitus  LUNG: Clear to auscultation with good chest expansion. There are no crackles or wheezes, normal AP diameter.  BACK: No kyphosis of the thoracic spine. Symmetric, no curvature, she is sitting erect in chair, fully dressed.  CVS: There is good S1  S2, rhythm is regular.  ABDOMEN:  Globular and soft, nontender to palpation, non distended, no masses, no organomegaly, good bowel sounds, no rebound or guarding, no peritoneal signs.   EXTREMITIES: Atraumatic. Full range of motion on both upper and lower extremities, there is no tenderness to palpation, no pedal edema, no cyanosis or clubbing, no calf tenderness, normal cap refill, no joint swelling.  SKIN: Warm and dry, no erythema noted, no rashes or lesions.  NEUROLOGICAL: The patient is oriented to person, place and time. Strength and sensation are grossly intact. Face is symmetric.                    LABS:    No results found for: WBC, HGB, HCT, MCV, PLT    No results found for this or any previous visit.        No results found for: HGBA1C  No results found for: [id number]  No results found for: [id number]    ASSESSMENT/PLAN:  1. Other chronic back pain    2. Back muscle spasm    3. Mobility impaired      1/2. Chronic back pain: On valium, lyrica and oxycodone. Reports on Tordal IV in acute care. We will trial some celebrex 100 mg po two times a day x 7 days to see if this is beneficial in conjunction with therapy.    3. Using walker due to impaired mobility. Will continue with therapy for strengthening and rehab services.         Electronically signed by:  Юлия Villatoro CNP  This progress note was completed using Dragon software and there may be grammatical errors.      42 minutes spent of which greater than 75% was face to face communication with the patient about above plan of care

## 2023-02-08 NOTE — PROGRESS NOTES
For any questions or concerns regarding your surgery within the first 72 hours please always call your surgeons office.     DISCHARGE INSTRUCTIONS:     Plastic and Reconstructive Surgery Instructions:    - Leave all surgical dressings in place until your follow-up appointment.     - Do not shower while drains are in place. You may complete sponge baths during that time.    - Continue to wear surgical bra at all times, except with sponge baths or with dressing changes.     - Do not place ice or heating pads on your incisions as this can cause ischemia and necrosis.    - Schedule yourself on Tylenol for additional pain relief for 2-3 days. Do not drink alcohol while you are taking Tylenol. Do not exceed maximum of 4000 mg of Tylenol from all sources in a 24 hour period. Continue with prescribed pain medication as needed for breakthrough pain. You may take a stool softener as needed for constipation.     - Continue to monitor, strip, measure, empty, and record drain outputs 2-3 times daily, document on your drain output record sheet. Please be sure to bring your drain sheet to your appointments every time until all of the drains have been removed. We will remove the drains one by one when it is appropriate for removal.    - Continue to ambulate/walk around at home as much as possible to prevent DVT (deep venous thrombosis)/clot formation.    - Continue with activity restrictions, avoid overhead reaching for now.     - Sleep propped up in bed with pillows or in a recliner for surgical site elevation to help with swelling.    - No lifting, pulling, or pushing more than 10 pounds for 6 weeks. Then no lifting, pulling, or pushing more than 20 pounds for an additional 6 weeks. Then no restrictions.    - No lakes, pools, hot tubs for 2 months after surgery.    - Continue to eat high-protein foods (white meat, fish, etc.) to promote healing of your wounds.     - If you have signs and symptoms of infection, including increased  Progress Notes by Юлия Villatoro CNP at 2018 12:34 PM     Author: Юлия Villatoro CNP Service: -- Author Type: Nurse Practitioner    Filed: 2018  7:02 PM Encounter Date: 2018 Status: Signed    : Юлия Villatoro CNP (Nurse Practitioner)       [facility]      NAME:  Laura Harvey             :  1960  MRN: [id number]  CODE STATUS:  FULL CODE    VISIT TYPE: DISCHARGE SUMMARY  FACILYTY: [facility] [[id number]]                    PRIMARY CARE PROVIDER: Provider, No Primary Care    DISCHARGE DIAGNOSIS:      1. Other chronic back pain    2. Influenza         DISCHARGE MEDICATIONS:         Medication List           Accurate as of 18  6:51 PM. If you have any questions, ask your nurse or doctor.               CONTINUE taking these medications    acetaminophen 500 MG tablet  Commonly known as:  TYLENOL     calcium-vitamin D 500 mg(1,250mg) -200 unit per tablet     celecoxib 100 MG capsule  Commonly known as:  CeleBREX     diazePAM 5 MG tablet  Commonly known as:  VALIUM     methocarbamol 500 MG tablet  Commonly known as:  ROBAXIN     multivitamin with minerals tablet     * oxyCODONE 20 mg 12 hr tablet  Commonly known as:  OxyCONTIN     * oxyCODONE 5 MG immediate release tablet  Commonly known as:  ROXICODONE  Take 2-3 tablets (10-15 mg total) by mouth every 4 (four) hours as needed for pain (2tabs pain 3-6, 3tabs pain 7-10).     oxymetazoline 0.05 % nasal spray  Commonly known as:  AFRIN     polyethylene glycol 17 gram packet  Commonly known as:  MIRALAX     pregabalin 75 MG capsule  Commonly known as:  LYRICA     propranolol 60 mg 24 hr capsule  Commonly known as:  INDERAL LA     ROLAIDS 550-110 mg Chew  Generic drug:  calcium carbonate-mag hydroxid     sennosides-docusate sodium 8.6-50 mg tablet  Generic drug:  senna-docusate         * This list has 2 medication(s) that are the same as other medications prescribed for you. Read the directions  carefully, and ask your doctor or other care provider to review them with you.                HISTORY OF PRESENT ILLNESS: Laura Harvey is a 58 y.o. female being seen for dc. She is in the TCU due to lumbar spinal fusion and extensive back surgery postoperatively she did well was discharged home but subsequently presented back to the hospital with acute postoperative pain with history of fall and dysuria she was diagnosed with a UTI and admitted back  This is a patient who was dependent on high doses of narcotics.  She continued to voice bilateral lower extremity weakness right greater than left with bilateral upper extremity weakness also.  She uses a walker for ambulation but was having difficulty with that, however gait has improved. She has also been diagnosed with influenza since admission. Discussed with her today and she feels she can rehab at home as yoselyn. I discussed her seeing an MD for f/u in 7 days, she has no PCP. . I am giving her a two week supply of her narcs ( see complete med list) and she must find a PCP or her surgeon for refills. Hard copies left with NM. She has been compliant with her brace use. She will need nebs upon dc. I also educated her on using IS, she reports due to her surgeries she has many at home.    SKILLED NURSING FACILITY COURSE:  During this TCU stay, patient completed all anticipated goals of therapy.      PHYSICAL EXAMINATION:    Vitals:    12/12/18 1846   BP: 103/65   Pulse: 78   Temp: 100.1  F (37.8  C)   Weight: 126 lb 1.6 oz (57.2 kg)         GENERAL: Awake, Alert, oriented x3, not in any form of acute distress, answers questions appropriately, follows simple commands, conversant  HEENT: Head is normocephalic with normal hair distribution. No evidence of trauma. Ears: No acute purulent discharge. Eyes: Conjunctivae pink with no scleral jaundice. Nose: Normal mucosa and septum.   CHEST: No tenderness or deformity, no crepitus  LUNG: Clear to auscultation with good chest  pain, swelling, redness, or purulent drainage (pus) from the incisions, fevers or chills.      - Follow up with Vicky Gibbs at our [facility] office ([address] [id number]) on 2/23 at 1:30 pm.     - Please call Dr. Mujica's office with any questions or concerns. Clinic number is [phone].    Patient/Family given For your Well Being Teaching Sheet:  _x__ Care After Anesthesia/ Sedation  _x__ Pain Management Tips  _x__ About Surgical Site infection  ___ Smoking and second hand Smoke information  ___ Other:           POST SURGICAL DRAIN CARE    Patient Name: Saray Parrish  YOB: 1985    1.  Please strip, empty, and re-charge the drains at least 2 times per day.  2.  Record the output of each drain daily and bring the record to each clinic visit.      Date Drain 1 Drain 2 Drain 3 Drain 4                                                                                                                                           expansion. There are MINIMAL crackles NO wheezes, normal AP diameter.  BACK: No kyphosis of the thoracic spine.Reportslow ervin pain.CVS: There is good S1  S2, there are no murmurs, rubs, gallops, or heaves, rhythm is regular.  ABDOMEN: Globular and soft, nontender to palpation, non distended, no masses, no organomegaly, good bowel sounds, no rebound or guarding, no peritoneal signs.   EXTREMITIES: Atraumatic. Full range of motion on both upper and lower extremities, there is no tenderness to palpation, no pedal edema, no cyanosis or clubbing, no calf tenderness, normal cap refill, no joint swelling.Walking with walker.  SKIN: Warm and dry, no erythema noted, no rashes or lesions.  NEUROLOGICAL: The patient is oriented to person, place and time. Strength and sensation are grossly intact. Face is symmetric.      LABS:  All labs reviewed in the nursing home record.        DISCHARGE PLAN:      Patient will follow up with PCP within 7- days after discharge for medication mangagment and appropriate lab studies.She reports no PCP, I gave her some location of HE clinics, she reports she will also look for [facility] in her area. Currently with influenza, recommend IS and report to ER if increase shortness of breath or any major concerns.        Patient received a hard script for: Two weeks of Lyrica, valium and oxycodone, see med list.      Electronically signed by:  Юлия Villatoro CNP  This progress note was completed using Dragon software and there may be grammatical errors.      For documentation purposes, chart review, medication management, and discharge coordination of care was greater than 35 minutes